# Patient Record
Sex: FEMALE | Race: WHITE | Employment: PART TIME | URBAN - METROPOLITAN AREA
[De-identification: names, ages, dates, MRNs, and addresses within clinical notes are randomized per-mention and may not be internally consistent; named-entity substitution may affect disease eponyms.]

---

## 2017-03-07 PROCEDURE — 36415 COLL VENOUS BLD VENIPUNCTURE: CPT | Performed by: INTERNAL MEDICINE

## 2017-03-07 PROCEDURE — 82306 VITAMIN D 25 HYDROXY: CPT | Performed by: INTERNAL MEDICINE

## 2017-03-07 PROCEDURE — 80061 LIPID PANEL: CPT | Performed by: INTERNAL MEDICINE

## 2017-03-07 PROCEDURE — 80053 COMPREHEN METABOLIC PANEL: CPT | Performed by: INTERNAL MEDICINE

## 2017-03-07 PROCEDURE — 84443 ASSAY THYROID STIM HORMONE: CPT | Performed by: INTERNAL MEDICINE

## 2018-02-13 ENCOUNTER — HOSPITAL ENCOUNTER (OUTPATIENT)
Dept: GENERAL RADIOLOGY | Age: 53
Discharge: HOME | End: 2018-02-13
Attending: INTERNAL MEDICINE
Payer: COMMERCIAL

## 2018-02-13 ENCOUNTER — LAB ENCOUNTER (OUTPATIENT)
Dept: LAB | Age: 53
End: 2018-02-13
Attending: INTERNAL MEDICINE
Payer: COMMERCIAL

## 2018-02-13 DIAGNOSIS — M79.642 PAIN IN BOTH HANDS: ICD-10-CM

## 2018-02-13 DIAGNOSIS — M79.641 PAIN IN BOTH HANDS: ICD-10-CM

## 2018-02-13 LAB
ALBUMIN SERPL-MCNC: 3.7 G/DL (ref 3.5–4.8)
ALP LIVER SERPL-CCNC: 74 U/L (ref 41–108)
ALT SERPL-CCNC: 21 U/L (ref 14–54)
AST SERPL-CCNC: 19 U/L (ref 15–41)
BASOPHILS # BLD AUTO: 0.06 X10(3) UL (ref 0–0.1)
BASOPHILS NFR BLD AUTO: 1 %
BILIRUB SERPL-MCNC: 0.9 MG/DL (ref 0.1–2)
BUN BLD-MCNC: 15 MG/DL (ref 8–20)
C-REACTIVE PROTEIN: 0.33 MG/DL (ref ?–1)
CALCIUM BLD-MCNC: 9.3 MG/DL (ref 8.3–10.3)
CHLORIDE: 105 MMOL/L (ref 101–111)
CO2: 29 MMOL/L (ref 22–32)
CREAT BLD-MCNC: 0.91 MG/DL (ref 0.55–1.02)
EOSINOPHIL # BLD AUTO: 0.1 X10(3) UL (ref 0–0.3)
EOSINOPHIL NFR BLD AUTO: 1.6 %
ERYTHROCYTE [DISTWIDTH] IN BLOOD BY AUTOMATED COUNT: 12.9 % (ref 11.5–16)
GLUCOSE BLD-MCNC: 84 MG/DL (ref 70–99)
HCT VFR BLD AUTO: 38.7 % (ref 34–50)
HGB BLD-MCNC: 12.4 G/DL (ref 12–16)
IMMATURE GRANULOCYTE COUNT: 0.01 X10(3) UL (ref 0–1)
IMMATURE GRANULOCYTE RATIO %: 0.2 %
LYMPHOCYTES # BLD AUTO: 1.99 X10(3) UL (ref 0.9–4)
LYMPHOCYTES NFR BLD AUTO: 31.8 %
M PROTEIN MFR SERPL ELPH: 7.2 G/DL (ref 6.1–8.3)
MCH RBC QN AUTO: 27.6 PG (ref 27–33.2)
MCHC RBC AUTO-ENTMCNC: 32 G/DL (ref 31–37)
MCV RBC AUTO: 86 FL (ref 81–100)
MONOCYTES # BLD AUTO: 0.59 X10(3) UL (ref 0.1–1)
MONOCYTES NFR BLD AUTO: 9.4 %
NEUTROPHIL ABS PRELIM: 3.5 X10 (3) UL (ref 1.3–6.7)
NEUTROPHILS # BLD AUTO: 3.5 X10(3) UL (ref 1.3–6.7)
NEUTROPHILS NFR BLD AUTO: 56 %
PLATELET # BLD AUTO: 348 10(3)UL (ref 150–450)
POTASSIUM SERPL-SCNC: 4.5 MMOL/L (ref 3.6–5.1)
RBC # BLD AUTO: 4.5 X10(6)UL (ref 3.8–5.1)
RED CELL DISTRIBUTION WIDTH-SD: 40.7 FL (ref 35.1–46.3)
RHEUMATOID FACT SERPL-ACNC: <10 IU/ML (ref ?–15)
SED RATE-ML: 7 MM/HR (ref 0–25)
SODIUM SERPL-SCNC: 141 MMOL/L (ref 136–144)
URIC ACID: 3.2 MG/DL (ref 2.4–8)
WBC # BLD AUTO: 6.3 X10(3) UL (ref 4–13)

## 2018-02-13 PROCEDURE — 85652 RBC SED RATE AUTOMATED: CPT

## 2018-02-13 PROCEDURE — 86431 RHEUMATOID FACTOR QUANT: CPT

## 2018-02-13 PROCEDURE — 85025 COMPLETE CBC W/AUTO DIFF WBC: CPT

## 2018-02-13 PROCEDURE — 80053 COMPREHEN METABOLIC PANEL: CPT

## 2018-02-13 PROCEDURE — 86140 C-REACTIVE PROTEIN: CPT

## 2018-02-13 PROCEDURE — 77077 JOINT SURVEY SINGLE VIEW: CPT | Performed by: INTERNAL MEDICINE

## 2018-02-13 PROCEDURE — 84550 ASSAY OF BLOOD/URIC ACID: CPT

## 2018-02-13 PROCEDURE — 36415 COLL VENOUS BLD VENIPUNCTURE: CPT

## 2018-09-27 ENCOUNTER — HOSPITAL ENCOUNTER (OUTPATIENT)
Dept: MAMMOGRAPHY | Facility: HOSPITAL | Age: 53
Discharge: HOME | End: 2018-09-27
Attending: SURGERY
Payer: COMMERCIAL

## 2018-09-27 DIAGNOSIS — R92.1 BREAST CALCIFICATIONS: ICD-10-CM

## 2018-09-27 DIAGNOSIS — N60.02 BILATERAL BREAST CYSTS: ICD-10-CM

## 2018-09-27 DIAGNOSIS — N60.01 BILATERAL BREAST CYSTS: ICD-10-CM

## 2018-09-27 PROCEDURE — 77066 DX MAMMO INCL CAD BI: CPT | Performed by: SURGERY

## 2018-09-27 PROCEDURE — 77062 BREAST TOMOSYNTHESIS BI: CPT | Performed by: SURGERY

## 2018-09-27 PROCEDURE — 76641 ULTRASOUND BREAST COMPLETE: CPT | Performed by: SURGERY

## 2018-09-27 NOTE — IMAGING NOTE
This Breast Care RN spoke with Petra Miller re Stereotactic breast biopsy recommendation by Dr. Mel Anderson . Joby Lawson reviewed and all questions answered.  Pt instructed to stop all blood thinners for 3 days before biopsy and 2 days after, such as ASA,

## 2018-10-01 ENCOUNTER — HOSPITAL ENCOUNTER (OUTPATIENT)
Dept: MAMMOGRAPHY | Facility: HOSPITAL | Age: 53
Discharge: HOME | End: 2018-10-01
Attending: SURGERY
Payer: COMMERCIAL

## 2018-10-01 DIAGNOSIS — R92.1 BREAST CALCIFICATIONS: ICD-10-CM

## 2018-10-01 PROCEDURE — 88305 TISSUE EXAM BY PATHOLOGIST: CPT | Performed by: SURGERY

## 2018-10-01 PROCEDURE — 19081 BX BREAST 1ST LESION STRTCTC: CPT | Performed by: SURGERY

## 2018-10-01 PROCEDURE — 88360 TUMOR IMMUNOHISTOCHEM/MANUAL: CPT | Performed by: SURGERY

## 2018-10-02 ENCOUNTER — LAB ENCOUNTER (OUTPATIENT)
Dept: LAB | Facility: HOSPITAL | Age: 53
End: 2018-10-02
Attending: INTERNAL MEDICINE
Payer: COMMERCIAL

## 2018-10-02 DIAGNOSIS — K62.5 RECTAL BLEEDING: ICD-10-CM

## 2018-10-02 PROCEDURE — 36415 COLL VENOUS BLD VENIPUNCTURE: CPT

## 2018-10-02 PROCEDURE — 85025 COMPLETE CBC W/AUTO DIFF WBC: CPT

## 2018-10-10 ENCOUNTER — GENETICS ENCOUNTER (OUTPATIENT)
Dept: GENETICS | Age: 53
End: 2018-10-10
Attending: GENETIC COUNSELOR, MS
Payer: COMMERCIAL

## 2018-10-10 PROCEDURE — 96040 HC GENETIC COUNSELING EA 30 MIN: CPT | Performed by: GENETIC COUNSELOR, MS

## 2018-10-10 NOTE — PROGRESS NOTES
Referring Provider: Kahlil Orozco MD    Additional Providers: MD Bravo Pickett MD    Reason for Referral:  Ingrid Hammans was referred for genetic counseling because of a personal and family history of breast cancer.   Ms. Troutvillemann Griggs three or more individuals in the same lineage. Mutations in the genes, BRCA1 and BRCA2, account for the majority of hereditary breast and ovarian cancer families.   Mutations in genes other than BRCA1/2, many of which now have medical management recommenda variant is responsible for an individual’s increased cancer risk. If Ms. Warren Rankin is found to carry a pathogenic variant in a cancer predisposition gene, she is at significantly increased risk for various cancers.  The magnitude of these risks, and the weeks and will call her with the results. Results will also be communicated to Dr. Adelina Panda, Dr. Maira Marie, and Dr. Iglesia Zepeda. Approximately 50 minutes was spent in consultation with Ms. Blanca Sloan.

## 2018-10-16 ENCOUNTER — HOSPITAL ENCOUNTER (OUTPATIENT)
Dept: MRI IMAGING | Age: 53
Discharge: HOME | End: 2018-10-16
Attending: SURGERY
Payer: COMMERCIAL

## 2018-10-16 DIAGNOSIS — D05.12 DUCTAL CARCINOMA IN SITU (DCIS) OF LEFT BREAST: ICD-10-CM

## 2018-10-16 PROCEDURE — 0159T MRI BREAST (W+WO) W/CAD BILAT (CPT=77059/0159T): CPT | Performed by: SURGERY

## 2018-10-16 PROCEDURE — 77059 MRI BREAST (W+WO) W/CAD BILAT (CPT=77059/0159T): CPT | Performed by: SURGERY

## 2018-10-16 PROCEDURE — A9575 INJ GADOTERATE MEGLUMI 0.1ML: HCPCS | Performed by: SURGERY

## 2018-10-19 ENCOUNTER — OFFICE VISIT (OUTPATIENT)
Dept: SURGERY | Facility: CLINIC | Age: 53
End: 2018-10-19
Payer: COMMERCIAL

## 2018-10-19 VITALS
SYSTOLIC BLOOD PRESSURE: 108 MMHG | DIASTOLIC BLOOD PRESSURE: 71 MMHG | WEIGHT: 142 LBS | HEIGHT: 64 IN | OXYGEN SATURATION: 100 % | HEART RATE: 59 BPM | BODY MASS INDEX: 24.24 KG/M2 | RESPIRATION RATE: 20 BRPM

## 2018-10-19 DIAGNOSIS — D05.12 BREAST NEOPLASM, TIS (DCIS), LEFT: Primary | ICD-10-CM

## 2018-10-19 PROCEDURE — 99205 OFFICE O/P NEW HI 60 MIN: CPT | Performed by: SURGERY

## 2018-10-21 NOTE — PROGRESS NOTES
Breast Surgery New Patient Consultation    This is the first visit for this 52year old woman, referred by self for second opinion, who presents for evaluation of DCIS.     History of Present Illness:   Ms. Lexus Back is a 52year old woman who pre ARTHROSCOPY OF JOINT UNLISTED      left ACL   • COLONOSCOPY  2009    normal   • COLONOSCOPY N/A 12/8/2015    Procedure: COLONOSCOPY;  Surgeon: Jc Lopez MD;  Location: [facility] ENDOSCOPY   • COLONOSCOPY N/A 12/8/2015    Performed by Jc Lopez MD at E date: 1/1/1990   Smokeless tobacco: Never Used   Comment: QUIT 20 YRS AGO   The patient is . She has 3 children. She is employed part-time.     Review of Systems:  General:   The patient denies, fever, chills, night sweats, fatigue, generalized wea muscle aches/pain,+ joint pain, stiff joints, neck pain, back pain or bone pain.     Neuropsychiatric:  There is no history of migraines or severe headaches, seizure/epilepsy, speech problems, coordination problems, trembling/tremors, fainting/black outs, d normal. There is no skin dimpling with movement of the pectoralis. There is no nipple retraction. No nipple discharge can be elicited. The parenchyma is mildly nodular.  There are no dominant masses in the breast. The axillary tail is normal.    Abdomen:  T treatment. The reasons for this were explained.  I explained that for pure DCIS, systemic therapy is not required, although endocrine agents such as tamoxifen can be used in women with hormone sensitive disease in order to reduce the risk of local recurrenc

## 2018-10-23 ENCOUNTER — TELEPHONE (OUTPATIENT)
Dept: SURGERY | Facility: CLINIC | Age: 53
End: 2018-10-23

## 2018-10-23 ENCOUNTER — NURSE ONLY (OUTPATIENT)
Dept: HEMATOLOGY/ONCOLOGY | Facility: HOSPITAL | Age: 53
End: 2018-10-23
Attending: GENETIC COUNSELOR, MS
Payer: COMMERCIAL

## 2018-10-23 ENCOUNTER — TELEPHONE (OUTPATIENT)
Dept: HEMATOLOGY/ONCOLOGY | Facility: HOSPITAL | Age: 53
End: 2018-10-23

## 2018-10-23 PROCEDURE — 36415 COLL VENOUS BLD VENIPUNCTURE: CPT

## 2018-10-23 NOTE — TELEPHONE ENCOUNTER
Pt identified herself on VM. I let her know I was following up on email sent by Dr Tigre Argueta this morning. I am aware that she had her genetics redrawn this morning. Asked her to call me back if I can be of assistance, or if she has other questions.

## 2018-10-23 NOTE — TELEPHONE ENCOUNTER
Ms. Tellopreston Wilkerson informed that 10/10/18 sample did not pass quality measures and a new sample is requested. MsElyssa Jacy Wilkerson will come in today at 12:30 for a 2nd blood draw.

## 2018-10-24 NOTE — H&P (VIEW-ONLY)
10/24/2018    Patient presents with:  Breast Follow-up: F/u left breast DCIS, discuss sx      HPI:    Magnus Perales is a 52year old female who presents for evaluation of DCIS left breast  We recently discussed her case at breast tumor board  She has (LEXAPRO) 10 MG Oral Tab TAKE 3 TABLETS (30 MG TOTAL) BY MOUTH EVERY MORNING. Disp: 270 tablet Rfl: 3   amphetamine-dextroamphetamine (ADDERALL) 10 MG Oral Tab Take 1 tablet (10 mg total) by mouth 3 (three) times daily.  Disp: 90 tablet Rfl: 0   Hyaluronic calcification found on diagnostic imaging of breast  DESCRIPTION:  Witnessed verbal and written informed consent was obtained. Time out was performed by the staff. The patient's prior mammograms were reviewed.   The procedure, potential risks and complicat weighted VIBRANT sequences were obtained with and without fat suppression. Post contrast VIBRANT 3D T1 weighted images after IV gadolinium were obtained.  1.6 mm slice reconstruction of 3D data sets with additional maximum intensity projects, subtraction, g cyst or hemangioma involving the posterior segment right lobe of the liver     BIRADS Code 6: LEFT BREAST. KNOWN BIOPSY PROVEN MALIGNANCY.    RECOMMENDATIONS:  FOLLOW-UP: Surgical consultation   PLEASE NOTE:  A NORMAL MRI DOES NOT EXCLUDE THE POSSIBILITY OF microcalcifications which appear to be stable since the prior examination. Tomography demonstrates bilateral breast nodularity. Biopsy clip is noted on the left in the upper outer quadrant.   Ultrasound of the right breast demonstrates a complex cyst inch Us Breast Bilat Comp([facility])(zdv=24570-94)    PROCEDURE:  US BREAST BILATERAL COMP ([facility] SAME DAY US)(CPT=76641-50)  COMPARISON:  None.   TECHNIQUE:  Left breast ultrasound was performed, to include all four quadrants of the breast and retroareo

## 2018-10-26 ENCOUNTER — GENETICS ENCOUNTER (OUTPATIENT)
Dept: HEMATOLOGY/ONCOLOGY | Facility: HOSPITAL | Age: 53
End: 2018-10-26

## 2018-10-26 NOTE — PROGRESS NOTES
Referring Provider:       Stacy Garner MD     Additional Providers:   MD Laurent Gonzalez MD    Reason for Referral:  Anna Mcqueen had genetic testing performed on 10/10/18 because of a pers

## 2018-10-30 RX ORDER — CELECOXIB 200 MG/1
200 CAPSULE ORAL AS NEEDED
COMMUNITY
End: 2019-08-05

## 2018-11-07 ENCOUNTER — TELEPHONE (OUTPATIENT)
Dept: MAMMOGRAPHY | Age: 53
End: 2018-11-07

## 2018-11-07 NOTE — TELEPHONE ENCOUNTER
Spoke with pt and discussed SLN mapping scheduled for Monday before surgery. I also provided lymphedema education, her questions were answered and provided emotional support.

## 2018-11-12 ENCOUNTER — ANESTHESIA EVENT (OUTPATIENT)
Dept: SURGERY | Facility: HOSPITAL | Age: 53
DRG: 581 | End: 2018-11-12
Payer: COMMERCIAL

## 2018-11-12 ENCOUNTER — ANESTHESIA (OUTPATIENT)
Dept: SURGERY | Facility: HOSPITAL | Age: 53
DRG: 581 | End: 2018-11-12
Payer: COMMERCIAL

## 2018-11-12 ENCOUNTER — HOSPITAL ENCOUNTER (OUTPATIENT)
Dept: NUCLEAR MEDICINE | Facility: HOSPITAL | Age: 53
Discharge: HOME | End: 2018-11-12
Attending: SURGERY
Payer: COMMERCIAL

## 2018-11-12 ENCOUNTER — HOSPITAL ENCOUNTER (INPATIENT)
Facility: HOSPITAL | Age: 53
LOS: 1 days | Discharge: HOME | DRG: 581 | End: 2018-11-13
Attending: SURGERY | Admitting: SURGERY
Payer: COMMERCIAL

## 2018-11-12 DIAGNOSIS — D05.12 DUCTAL CARCINOMA IN SITU (DCIS) OF LEFT BREAST: ICD-10-CM

## 2018-11-12 PROCEDURE — 78195 LYMPH SYSTEM IMAGING: CPT | Performed by: SURGERY

## 2018-11-12 PROCEDURE — 0HRV0JZ REPLACEMENT OF BILATERAL BREAST WITH SYNTHETIC SUBSTITUTE, OPEN APPROACH: ICD-10-PCS | Performed by: SURGERY

## 2018-11-12 PROCEDURE — 88307 TISSUE EXAM BY PATHOLOGIST: CPT | Performed by: SURGERY

## 2018-11-12 PROCEDURE — 88331 PATH CONSLTJ SURG 1 BLK 1SPC: CPT | Performed by: SURGERY

## 2018-11-12 PROCEDURE — 0HTV0ZZ RESECTION OF BILATERAL BREAST, OPEN APPROACH: ICD-10-PCS | Performed by: SURGERY

## 2018-11-12 PROCEDURE — 07B60ZX EXCISION OF LEFT AXILLARY LYMPHATIC, OPEN APPROACH, DIAGNOSTIC: ICD-10-PCS | Performed by: SURGERY

## 2018-11-12 PROCEDURE — A4216 STERILE WATER/SALINE, 10 ML: HCPCS

## 2018-11-12 DEVICE — IMPLANTABLE DEVICE: Type: IMPLANTABLE DEVICE | Site: BREAST | Status: FUNCTIONAL

## 2018-11-12 RX ORDER — ZOLPIDEM TARTRATE 5 MG/1
5 TABLET ORAL NIGHTLY PRN
Status: DISCONTINUED | OUTPATIENT
Start: 2018-11-12 | End: 2018-11-13

## 2018-11-12 RX ORDER — MORPHINE SULFATE 4 MG/ML
4 INJECTION, SOLUTION INTRAMUSCULAR; INTRAVENOUS EVERY 2 HOUR PRN
Status: DISCONTINUED | OUTPATIENT
Start: 2018-11-12 | End: 2018-11-13

## 2018-11-12 RX ORDER — NALOXONE HYDROCHLORIDE 0.4 MG/ML
80 INJECTION, SOLUTION INTRAMUSCULAR; INTRAVENOUS; SUBCUTANEOUS AS NEEDED
Status: DISCONTINUED | OUTPATIENT
Start: 2018-11-12 | End: 2018-11-12 | Stop reason: HOSPADM

## 2018-11-12 RX ORDER — SODIUM CHLORIDE, SODIUM LACTATE, POTASSIUM CHLORIDE, CALCIUM CHLORIDE 600; 310; 30; 20 MG/100ML; MG/100ML; MG/100ML; MG/100ML
INJECTION, SOLUTION INTRAVENOUS CONTINUOUS
Status: DISCONTINUED | OUTPATIENT
Start: 2018-11-12 | End: 2018-11-12 | Stop reason: HOSPADM

## 2018-11-12 RX ORDER — ACETAMINOPHEN 500 MG
1000 TABLET ORAL ONCE
Status: COMPLETED | OUTPATIENT
Start: 2018-11-12 | End: 2018-11-12

## 2018-11-12 RX ORDER — DOCUSATE SODIUM 100 MG/1
100 CAPSULE, LIQUID FILLED ORAL 2 TIMES DAILY
Status: DISCONTINUED | OUTPATIENT
Start: 2018-11-12 | End: 2018-11-13

## 2018-11-12 RX ORDER — ONDANSETRON 2 MG/ML
4 INJECTION INTRAMUSCULAR; INTRAVENOUS EVERY 6 HOURS PRN
Status: DISCONTINUED | OUTPATIENT
Start: 2018-11-12 | End: 2018-11-13

## 2018-11-12 RX ORDER — DIPHENHYDRAMINE HYDROCHLORIDE 50 MG/ML
12.5 INJECTION INTRAMUSCULAR; INTRAVENOUS AS NEEDED
Status: DISCONTINUED | OUTPATIENT
Start: 2018-11-12 | End: 2018-11-12 | Stop reason: HOSPADM

## 2018-11-12 RX ORDER — SODIUM CHLORIDE, SODIUM LACTATE, POTASSIUM CHLORIDE, CALCIUM CHLORIDE 600; 310; 30; 20 MG/100ML; MG/100ML; MG/100ML; MG/100ML
INJECTION, SOLUTION INTRAVENOUS CONTINUOUS
Status: DISCONTINUED | OUTPATIENT
Start: 2018-11-12 | End: 2018-11-12

## 2018-11-12 RX ORDER — MORPHINE SULFATE 4 MG/ML
2 INJECTION, SOLUTION INTRAMUSCULAR; INTRAVENOUS EVERY 2 HOUR PRN
Status: DISCONTINUED | OUTPATIENT
Start: 2018-11-12 | End: 2018-11-13

## 2018-11-12 RX ORDER — METOCLOPRAMIDE HYDROCHLORIDE 5 MG/ML
10 INJECTION INTRAMUSCULAR; INTRAVENOUS EVERY 6 HOURS PRN
Status: DISCONTINUED | OUTPATIENT
Start: 2018-11-12 | End: 2018-11-13

## 2018-11-12 RX ORDER — HYDROCODONE BITARTRATE AND ACETAMINOPHEN 5; 325 MG/1; MG/1
2 TABLET ORAL EVERY 4 HOURS PRN
Status: DISCONTINUED | OUTPATIENT
Start: 2018-11-12 | End: 2018-11-13

## 2018-11-12 RX ORDER — CEFAZOLIN SODIUM/WATER 2 G/20 ML
SYRINGE (ML) INTRAVENOUS
Status: DISPENSED
Start: 2018-11-12 | End: 2018-11-12

## 2018-11-12 RX ORDER — ACETAMINOPHEN 325 MG/1
650 TABLET ORAL EVERY 4 HOURS PRN
Status: DISCONTINUED | OUTPATIENT
Start: 2018-11-12 | End: 2018-11-13

## 2018-11-12 RX ORDER — BISACODYL 10 MG
10 SUPPOSITORY, RECTAL RECTAL
Status: DISCONTINUED | OUTPATIENT
Start: 2018-11-12 | End: 2018-11-13

## 2018-11-12 RX ORDER — SODIUM PHOSPHATE, DIBASIC AND SODIUM PHOSPHATE, MONOBASIC 7; 19 G/133ML; G/133ML
1 ENEMA RECTAL ONCE AS NEEDED
Status: DISCONTINUED | OUTPATIENT
Start: 2018-11-12 | End: 2018-11-13

## 2018-11-12 RX ORDER — MORPHINE SULFATE 4 MG/ML
INJECTION, SOLUTION INTRAMUSCULAR; INTRAVENOUS
Status: COMPLETED
Start: 2018-11-12 | End: 2018-11-12

## 2018-11-12 RX ORDER — DEXTROSE, SODIUM CHLORIDE, SODIUM LACTATE, POTASSIUM CHLORIDE, AND CALCIUM CHLORIDE 5; .6; .31; .03; .02 G/100ML; G/100ML; G/100ML; G/100ML; G/100ML
INJECTION, SOLUTION INTRAVENOUS CONTINUOUS
Status: DISCONTINUED | OUTPATIENT
Start: 2018-11-12 | End: 2018-11-13

## 2018-11-12 RX ORDER — CEFAZOLIN SODIUM/WATER 2 G/20 ML
2 SYRINGE (ML) INTRAVENOUS ONCE
Status: COMPLETED | OUTPATIENT
Start: 2018-11-12 | End: 2018-11-12

## 2018-11-12 RX ORDER — HYDROCODONE BITARTRATE AND ACETAMINOPHEN 5; 325 MG/1; MG/1
1 TABLET ORAL EVERY 4 HOURS PRN
Status: DISCONTINUED | OUTPATIENT
Start: 2018-11-12 | End: 2018-11-13

## 2018-11-12 RX ORDER — CEFAZOLIN SODIUM/WATER 2 G/20 ML
2 SYRINGE (ML) INTRAVENOUS EVERY 8 HOURS
Status: DISCONTINUED | OUTPATIENT
Start: 2018-11-12 | End: 2018-11-13

## 2018-11-12 RX ORDER — MEPERIDINE HYDROCHLORIDE 25 MG/ML
12.5 INJECTION INTRAMUSCULAR; INTRAVENOUS; SUBCUTANEOUS AS NEEDED
Status: DISCONTINUED | OUTPATIENT
Start: 2018-11-12 | End: 2018-11-12 | Stop reason: HOSPADM

## 2018-11-12 RX ORDER — MORPHINE SULFATE 4 MG/ML
2 INJECTION, SOLUTION INTRAMUSCULAR; INTRAVENOUS EVERY 5 MIN PRN
Status: DISCONTINUED | OUTPATIENT
Start: 2018-11-12 | End: 2018-11-12 | Stop reason: HOSPADM

## 2018-11-12 RX ORDER — POLYETHYLENE GLYCOL 3350 17 G/17G
17 POWDER, FOR SOLUTION ORAL DAILY PRN
Status: DISCONTINUED | OUTPATIENT
Start: 2018-11-12 | End: 2018-11-13

## 2018-11-12 RX ORDER — METOCLOPRAMIDE HYDROCHLORIDE 5 MG/ML
10 INJECTION INTRAMUSCULAR; INTRAVENOUS AS NEEDED
Status: DISCONTINUED | OUTPATIENT
Start: 2018-11-12 | End: 2018-11-12 | Stop reason: HOSPADM

## 2018-11-12 RX ORDER — SCOLOPAMINE TRANSDERMAL SYSTEM 1 MG/1
1 PATCH, EXTENDED RELEASE TRANSDERMAL
Status: DISCONTINUED | OUTPATIENT
Start: 2018-11-12 | End: 2018-11-12 | Stop reason: HOSPADM

## 2018-11-12 RX ORDER — LIDOCAINE AND PRILOCAINE 25; 25 MG/G; MG/G
CREAM TOPICAL ONCE
Status: COMPLETED | OUTPATIENT
Start: 2018-11-12 | End: 2018-11-12

## 2018-11-12 RX ORDER — CEFAZOLIN SODIUM 1 G/3ML
INJECTION, POWDER, FOR SOLUTION INTRAMUSCULAR; INTRAVENOUS
Status: DISCONTINUED | OUTPATIENT
Start: 2018-11-12 | End: 2018-11-12 | Stop reason: HOSPADM

## 2018-11-12 RX ORDER — DIAZEPAM 5 MG/1
5 TABLET ORAL AS NEEDED
Status: DISCONTINUED | OUTPATIENT
Start: 2018-11-12 | End: 2018-11-12 | Stop reason: HOSPADM

## 2018-11-12 RX ORDER — MORPHINE SULFATE 4 MG/ML
1 INJECTION, SOLUTION INTRAMUSCULAR; INTRAVENOUS EVERY 2 HOUR PRN
Status: DISCONTINUED | OUTPATIENT
Start: 2018-11-12 | End: 2018-11-13

## 2018-11-12 RX ORDER — ONDANSETRON 2 MG/ML
4 INJECTION INTRAMUSCULAR; INTRAVENOUS AS NEEDED
Status: DISCONTINUED | OUTPATIENT
Start: 2018-11-12 | End: 2018-11-12 | Stop reason: HOSPADM

## 2018-11-12 RX ORDER — MIDAZOLAM HYDROCHLORIDE 1 MG/ML
1 INJECTION INTRAMUSCULAR; INTRAVENOUS EVERY 5 MIN PRN
Status: DISCONTINUED | OUTPATIENT
Start: 2018-11-12 | End: 2018-11-12 | Stop reason: HOSPADM

## 2018-11-12 RX ORDER — BACITRACIN 50000 [USP'U]/1
INJECTION, POWDER, LYOPHILIZED, FOR SOLUTION INTRAMUSCULAR AS NEEDED
Status: DISCONTINUED | OUTPATIENT
Start: 2018-11-12 | End: 2018-11-12 | Stop reason: HOSPADM

## 2018-11-12 NOTE — PLAN OF CARE
Pt admitted to room 316. Family at bedside. A&O, drowsy. RA, spo2>93%. SCD's on.  NAZARIO x 4 as charted. Denies nausea, pain managed adequately with IV morphine. Will continue to monitor.

## 2018-11-12 NOTE — ANESTHESIA POSTPROCEDURE EVALUATION
[address] Patient Status:  Surgery Admit   Age/Gender 52year old female MRN [id number]   Location [address] [facility] Attending Trenton Rocha MD   Hosp Day # 0 PCP MD Jayna Hills

## 2018-11-12 NOTE — PROGRESS NOTES
This note also relates to the following rows which could not be included:  SpO2 - Cannot attach notes to unvalidated device data  Pulse - Cannot attach notes to unvalidated device data  Resp - Cannot attach notes to unvalidated device data    Report called

## 2018-11-12 NOTE — BRIEF OP NOTE
Pre-Operative Diagnosis: Ductal carcinoma in situ (DCIS) of left breast [D05.12]     Post-Operative Diagnosis: Ductal carcinoma in situ (DCIS) of left breast [D05.12]      Procedure Performed:   Procedure(s):  BILATERAL BREAST NIPPLE SPARING MASTECTOMY WIT

## 2018-11-12 NOTE — ANESTHESIA PREPROCEDURE EVALUATION
PRE-OP EVALUATION    Patient Name: Ad Boateng    Pre-op Diagnosis: Ductal carcinoma in situ (DCIS) of left breast [D05.12]    Procedure(s):  BILATERAL BREAST NIPPLE SPARING MASTECTOMY WITH LEFT SENTINEL LYMPH NODE BIOPSY, POSSIBLE AXILLARY DISSECT tolerance: good     MET: >4                                           Endo/Other    Negative endo/other ROS. Pulmonary    Negative pulmonary ROS.                        Neuro/Psych    Negative neuro/psych ROS.    (+) anxiety (AD 28.2 10/02/2018    MCHC 32.4 10/02/2018    RDW 13.2 10/02/2018    .0 10/02/2018               Airway      Mallampati: II  Mouth opening: 3 FB  TM distance: 4 - 6 cm  Neck ROM: full Cardiovascular      Rhythm: regular  Rate: normal     Dental    No n

## 2018-11-12 NOTE — OPERATIVE REPORT
[address]    PATIENT'S NAME: Rosalba Leno   ATTENDING PHYSICIAN: Silvia Baires M.D. OPERATING PHYSICIAN: Silvia Baires M.D.    PATIENT ACCOUNT#:   [de-identified]    LOCATION:  [address] [place] [address] [facility]  MEDICAL RECORD #:   [id number]       DATE OF BI using electrocautery. When this was completed, I  the breast mound off of the overlying skin and nipple complex I marked the base of the nipple with a silk suture. When the breast was removed, I placed a second stitch at the axillary tail.   The

## 2018-11-12 NOTE — INTERVAL H&P NOTE
Pre-op Diagnosis: Ductal carcinoma in situ (DCIS) of left breast [D05.12]    The above referenced H&P was reviewed by Aparna Neville MD on 11/12/2018, the patient was examined and no significant changes have occurred in the patient's condition since the H&

## 2018-11-13 VITALS
RESPIRATION RATE: 18 BRPM | TEMPERATURE: 99 F | SYSTOLIC BLOOD PRESSURE: 97 MMHG | HEART RATE: 58 BPM | DIASTOLIC BLOOD PRESSURE: 56 MMHG | OXYGEN SATURATION: 95 % | HEIGHT: 64 IN | BODY MASS INDEX: 23.9 KG/M2 | WEIGHT: 140 LBS

## 2018-11-13 RX ORDER — HYDROCODONE BITARTRATE AND ACETAMINOPHEN 5; 325 MG/1; MG/1
1 TABLET ORAL EVERY 4 HOURS PRN
Qty: 30 TABLET | Refills: 0 | Status: SHIPPED | OUTPATIENT
Start: 2018-11-13 | End: 2018-11-23

## 2018-11-13 RX ORDER — CEFADROXIL 500 MG/1
500 CAPSULE ORAL 2 TIMES DAILY
Qty: 28 CAPSULE | Refills: 0 | Status: SHIPPED | OUTPATIENT
Start: 2018-11-13 | End: 2018-11-27

## 2018-11-13 NOTE — PLAN OF CARE
PAIN - ADULT    • Verbalizes/displays adequate comfort level or patient's stated pain goal Progressing        Assumed care at 2300. In no distress, Surgical bra on with gauze. 4 NAZARIO's intact draining small to moderate bloody drainage.  Morphine 2mg at 2am fo

## 2018-11-13 NOTE — CM/SW NOTE
Met briefly with pt. She is comfortable managing the 4 drains at home and does not wish for [facility] to be ordered. / to remain available for support and/or discharge planning.      Reyna Soriano RN, [facility]    [phone] pgr:

## 2018-11-13 NOTE — PROGRESS NOTES
[facility]  Progress Note    Shun Coe Patient Status:  Observation    1965 MRN [id number]   [facility]A Attending Jeffy Erwin MD   Hosp Day # 0 PCP Thom Frankel, MD     Subjective:    Patient tolerating P

## 2018-11-15 NOTE — OPERATIVE REPORT
[facility]    PATIENT'S NAME: Alexi Macias   ATTENDING PHYSICIAN: Chai Mills M.D. OPERATING PHYSICIAN: Kayley Craft M.D.    PATIENT ACCOUNT#:   [de-identified]    LOCATION:  [address] [place] [address]  MEDICAL RECORD #:   [id number]       DATE OF BIR intraoperative Spy was done to confirm blood supply to the skin flaps. There was only a small area, about the size of a pencil eraser, that had decreased blood supply in the inferior portion of the right side.   The remainder of the blood supply to both br pink, healthy, and viable at the termination of the procedure. Good capillary refills over both nipples. The patient was taken from the operating room, extubated, to recovery room in stable condition.   She will be admitted for postoperative pain control

## 2019-03-12 PROCEDURE — 86200 CCP ANTIBODY: CPT | Performed by: INTERNAL MEDICINE

## 2019-07-31 PROBLEM — Z71.1 PERSON WITH FEARED COMPLAINT IN WHOM NO DIAGNOSIS WAS MADE: Status: ACTIVE | Noted: 2019-07-31

## 2019-08-20 PROBLEM — K62.5 RECTAL BLEEDING: Status: ACTIVE | Noted: 2019-08-20

## 2019-08-20 PROBLEM — Z80.0 FAMILY HISTORY OF COLON CANCER: Status: ACTIVE | Noted: 2019-08-20

## 2019-08-20 PROBLEM — R19.4 ALTERED BOWEL HABITS: Status: ACTIVE | Noted: 2019-08-20

## 2019-08-22 PROCEDURE — 88365 INSITU HYBRIDIZATION (FISH): CPT | Performed by: INTERNAL MEDICINE

## 2019-08-22 PROCEDURE — 88305 TISSUE EXAM BY PATHOLOGIST: CPT | Performed by: INTERNAL MEDICINE

## 2020-01-24 ENCOUNTER — OFFICE VISIT (OUTPATIENT)
Dept: SURGERY | Facility: CLINIC | Age: 55
End: 2020-01-24
Payer: COMMERCIAL

## 2020-01-24 VITALS
DIASTOLIC BLOOD PRESSURE: 81 MMHG | SYSTOLIC BLOOD PRESSURE: 125 MMHG | RESPIRATION RATE: 20 BRPM | TEMPERATURE: 99 F | WEIGHT: 129.81 LBS | HEART RATE: 73 BPM | BODY MASS INDEX: 23 KG/M2

## 2020-01-24 DIAGNOSIS — D05.12 BREAST NEOPLASM, TIS (DCIS), LEFT: ICD-10-CM

## 2020-01-24 DIAGNOSIS — N63.0 BREAST LUMP: Primary | ICD-10-CM

## 2020-01-24 PROCEDURE — 99214 OFFICE O/P EST MOD 30 MIN: CPT | Performed by: SURGERY

## 2020-01-24 PROCEDURE — 76642 ULTRASOUND BREAST LIMITED: CPT | Performed by: SURGERY

## 2020-03-20 NOTE — PROGRESS NOTES
Breast Surgery Surveillance    History of Present Illness:   Ms. Rosalia Leo is a 62year old woman who presented with DCIS of the left breast. She does have a remote history of benign biopsies in the left breast in 2015.   She reports that she has Anxiety state, unspecified    • Attention deficit hyperactivity disorder (ADHD)    • Cancer ([facility] [address] 11/2018    DCIS left breast   • DEPRESSION    • Osteoarthritis    • PONV (postoperative nausea and vomiting)    • Visual impairment        Past Surgical Histo just recently discontinued from with new diagnosis. She denies any history of oral contraceptive use. She has recieved infertility treatment to achieve pregnancy.     Medications:    No outpatient medications have been marked as taking for the 1/24/20 enc history of difficulty or pain with swallowing, reflux symptoms, vomiting, dark or bloody stools, constipation, yellowing of the skin, indigestion, nausea, change in bowel habits, diarrhea, abdominal pain or vomiting blood.      Genitourinary:  The patient d masses/nodules. There are no palpable masses. The trachea is in the midline. Conjunctiva are clear, non-icteric. Chest: The chest expands symmetrically. The lungs are clear to auscultation. Heart: The rhythm is regular.   There are no murmurs, rubs, g well encapsulated area measuring a total of 1.1 cm x 0.3 cm at the 9:00 area immediately adjacent to her sternum in the left chest wall adjacent to the capsule of her implant.   She had 2 areas of hypoechoic similar-appearing fat necrosis in the right chest

## 2020-07-11 ENCOUNTER — TELEPHONE (OUTPATIENT)
Dept: INTERNAL MEDICINE CLINIC | Facility: HOSPITAL | Age: 55
End: 2020-07-11

## 2020-07-11 DIAGNOSIS — Z20.822 SUSPECTED COVID-19 VIRUS INFECTION: Primary | ICD-10-CM

## 2020-07-13 ENCOUNTER — LAB ENCOUNTER (OUTPATIENT)
Dept: LAB | Facility: HOSPITAL | Age: 55
End: 2020-07-13
Attending: PREVENTIVE MEDICINE
Payer: COMMERCIAL

## 2020-07-13 DIAGNOSIS — Z20.822 SUSPECTED COVID-19 VIRUS INFECTION: ICD-10-CM

## 2020-07-13 LAB — SARS-COV-2 RNA RESP QL NAA+PROBE: NOT DETECTED

## 2020-07-13 NOTE — PROGRESS NOTES
Rapid covid test - not detected [facility] and resulting.

## 2020-07-13 NOTE — IMMEDIATE CARE/WORKERS COMP PHYSICIAN REPORT
Called employee (verified by 2 identifiers) to give NEGATIVE results of COVID test that were reviewed by Dr. Cheyenne Lacy.     Per [facility] negative COVID guidelines:    · Employee instructed to continue to self-monitor symptoms and RTW when fever free for 24

## (undated) DEVICE — CURVED, SMALL JAW, OPEN SEALER/DIVIDER: Brand: LIGASURE

## (undated) DEVICE — 3M™ IOBAN™ 2 ANTIMICROBIAL INCISE DRAPE 6648EZ: Brand: IOBAN™ 2

## (undated) DEVICE — PLASTIC BREAST CDS-LF: Brand: MEDLINE INDUSTRIES, INC.

## (undated) DEVICE — DRAIN RELIAVAC 100CC

## (undated) DEVICE — DERMABOND LIQUID ADHESIVE

## (undated) DEVICE — UNDYED BRAIDED (POLYGLACTIN 910), SYNTHETIC ABSORBABLE SUTURE: Brand: COATED VICRYL

## (undated) DEVICE — MINI LAP PACK-LF: Brand: MEDLINE INDUSTRIES, INC.

## (undated) DEVICE — HEX-LOCKING BLADE ELECTRODE: Brand: EDGE

## (undated) DEVICE — VIOLET BRAIDED (POLYGLACTIN 910), SYNTHETIC ABSORBABLE SUTURE: Brand: COATED VICRYL

## (undated) DEVICE — SUTURE SILK 0 FSL

## (undated) DEVICE — GAMMEX® PI HYBRID SIZE 7.5, STERILE POWDER-FREE SURGICAL GLOVE, POLYISOPRENE AND NEOPRENE BLEND: Brand: GAMMEX

## (undated) DEVICE — VIAL LABORATORY SPY

## (undated) DEVICE — DALE POST SURGICAL BRA, LARGE, FITS B-D, 36"-38", 1 PER BOX.: Brand: DALE POST SURGICAL BRA

## (undated) DEVICE — BLADE ELECTROSURG 4IN INSULATE

## (undated) DEVICE — STERILE (15.2 X 244CM) POLYETHYLENE TELESCOPICALLY-FOLDED COVER WITH ATTACHED (3CM) NEOGUARD™ TIP: Brand: SURGI-TIP TRANSDUCER COVER

## (undated) DEVICE — DRAIN BLAKE ROUND 15FR

## (undated) DEVICE — SOL  .9 1000ML BTL

## (undated) DEVICE — SUTURE MONOCRYL 3-0 PS-2

## (undated) DEVICE — KENDALL SCD EXPRESS SLEEVES, KNEE LENGTH, MEDIUM: Brand: KENDALL SCD

## (undated) DEVICE — PREVENA DUO PEEL & PLACE SYSTEM KIT- 13 CM: Brand: PREVENA DUO™ PEEL & PLACE™

## (undated) DEVICE — Device: Brand: PLUMEPEN

## (undated) DEVICE — SUTURE SILK 2-0 SH

## (undated) DEVICE — SPONGE: SPECIALTY PEANUT XR 100/CS: Brand: MEDICAL ACTION INDUSTRIES

## (undated) DEVICE — SUTURE MONOCRYL 4-0 PS-2

## (undated) DEVICE — ADHESIVE MASTISOL 2/3CC VL

## (undated) DEVICE — DRAPE EQUIPMENT INTRATEMP THER